# Patient Record
Sex: MALE | Race: WHITE | NOT HISPANIC OR LATINO | Employment: UNEMPLOYED | URBAN - NONMETROPOLITAN AREA
[De-identification: names, ages, dates, MRNs, and addresses within clinical notes are randomized per-mention and may not be internally consistent; named-entity substitution may affect disease eponyms.]

---

## 2024-05-20 ENCOUNTER — HOSPITAL ENCOUNTER (EMERGENCY)
Facility: HOSPITAL | Age: 69
Discharge: PSYCHIATRIC OR SUBSTANCE USE FACILITY | DRG: 057 | End: 2024-05-20
Attending: EMERGENCY MEDICINE | Admitting: EMERGENCY MEDICINE
Payer: MEDICARE

## 2024-05-20 ENCOUNTER — HOSPITAL ENCOUNTER (INPATIENT)
Facility: HOSPITAL | Age: 69
LOS: 4 days | Discharge: HOME | DRG: 057 | End: 2024-05-24
Attending: PSYCHIATRY & NEUROLOGY | Admitting: PSYCHIATRY & NEUROLOGY
Payer: MEDICARE

## 2024-05-20 VITALS
HEIGHT: 73 IN | BODY MASS INDEX: 27.17 KG/M2 | WEIGHT: 205 LBS | HEART RATE: 60 BPM | SYSTOLIC BLOOD PRESSURE: 101 MMHG | OXYGEN SATURATION: 99 % | TEMPERATURE: 97.8 F | RESPIRATION RATE: 17 BRPM | DIASTOLIC BLOOD PRESSURE: 50 MMHG

## 2024-05-20 DIAGNOSIS — R44.3 HALLUCINATIONS: Primary | ICD-10-CM

## 2024-05-20 PROBLEM — R46.89 BEHAVIORAL CHANGE: Status: ACTIVE | Noted: 2024-05-20

## 2024-05-20 LAB
ALBUMIN SERPL-MCNC: 4.2 G/DL (ref 3.5–5.2)
ALBUMIN/GLOB SERPL: 1.6 G/DL
ALP SERPL-CCNC: 86 U/L (ref 39–117)
ALT SERPL W P-5'-P-CCNC: 9 U/L (ref 1–41)
AMPHET+METHAMPHET UR QL: NEGATIVE
AMPHETAMINES UR QL: NEGATIVE
ANION GAP SERPL CALCULATED.3IONS-SCNC: 8.3 MMOL/L (ref 5–15)
AST SERPL-CCNC: 12 U/L (ref 1–40)
BARBITURATES UR QL SCN: NEGATIVE
BASOPHILS # BLD AUTO: 0.07 10*3/MM3 (ref 0–0.2)
BASOPHILS NFR BLD AUTO: 1.2 % (ref 0–1.5)
BENZODIAZ UR QL SCN: NEGATIVE
BILIRUB SERPL-MCNC: 0.8 MG/DL (ref 0–1.2)
BILIRUB UR QL STRIP: ABNORMAL
BUN SERPL-MCNC: 16 MG/DL (ref 8–23)
BUN/CREAT SERPL: 17.8 (ref 7–25)
BUPRENORPHINE SERPL-MCNC: NEGATIVE NG/ML
CALCIUM SPEC-SCNC: 9.2 MG/DL (ref 8.6–10.5)
CANNABINOIDS SERPL QL: NEGATIVE
CHLORIDE SERPL-SCNC: 109 MMOL/L (ref 98–107)
CLARITY UR: CLEAR
CO2 SERPL-SCNC: 25.7 MMOL/L (ref 22–29)
COCAINE UR QL: NEGATIVE
COLOR UR: ABNORMAL
CREAT SERPL-MCNC: 0.9 MG/DL (ref 0.76–1.27)
DEPRECATED RDW RBC AUTO: 41.6 FL (ref 37–54)
EGFRCR SERPLBLD CKD-EPI 2021: 93.6 ML/MIN/1.73
EOSINOPHIL # BLD AUTO: 0.12 10*3/MM3 (ref 0–0.4)
EOSINOPHIL NFR BLD AUTO: 2 % (ref 0.3–6.2)
ERYTHROCYTE [DISTWIDTH] IN BLOOD BY AUTOMATED COUNT: 12.7 % (ref 12.3–15.4)
ETHANOL BLD-MCNC: <10 MG/DL (ref 0–10)
ETHANOL UR QL: <0.01 %
FENTANYL UR-MCNC: NEGATIVE NG/ML
GLOBULIN UR ELPH-MCNC: 2.7 GM/DL
GLUCOSE SERPL-MCNC: 113 MG/DL (ref 65–99)
GLUCOSE UR STRIP-MCNC: NEGATIVE MG/DL
HCT VFR BLD AUTO: 39.5 % (ref 37.5–51)
HGB BLD-MCNC: 13.2 G/DL (ref 13–17.7)
HGB UR QL STRIP.AUTO: NEGATIVE
IMM GRANULOCYTES # BLD AUTO: 0.02 10*3/MM3 (ref 0–0.05)
IMM GRANULOCYTES NFR BLD AUTO: 0.3 % (ref 0–0.5)
KETONES UR QL STRIP: ABNORMAL
LEUKOCYTE ESTERASE UR QL STRIP.AUTO: NEGATIVE
LYMPHOCYTES # BLD AUTO: 0.62 10*3/MM3 (ref 0.7–3.1)
LYMPHOCYTES NFR BLD AUTO: 10.6 % (ref 19.6–45.3)
MAGNESIUM SERPL-MCNC: 2.2 MG/DL (ref 1.6–2.4)
MCH RBC QN AUTO: 30.4 PG (ref 26.6–33)
MCHC RBC AUTO-ENTMCNC: 33.4 G/DL (ref 31.5–35.7)
MCV RBC AUTO: 91 FL (ref 79–97)
METHADONE UR QL SCN: NEGATIVE
MONOCYTES # BLD AUTO: 0.32 10*3/MM3 (ref 0.1–0.9)
MONOCYTES NFR BLD AUTO: 5.5 % (ref 5–12)
NEUTROPHILS NFR BLD AUTO: 4.71 10*3/MM3 (ref 1.7–7)
NEUTROPHILS NFR BLD AUTO: 80.4 % (ref 42.7–76)
NITRITE UR QL STRIP: NEGATIVE
NRBC BLD AUTO-RTO: 0 /100 WBC (ref 0–0.2)
OPIATES UR QL: POSITIVE
OXYCODONE UR QL SCN: NEGATIVE
PCP UR QL SCN: NEGATIVE
PH UR STRIP.AUTO: 5.5 [PH] (ref 5–8)
PLATELET # BLD AUTO: 182 10*3/MM3 (ref 140–450)
PMV BLD AUTO: 9.2 FL (ref 6–12)
POTASSIUM SERPL-SCNC: 4 MMOL/L (ref 3.5–5.2)
PROT SERPL-MCNC: 6.9 G/DL (ref 6–8.5)
PROT UR QL STRIP: ABNORMAL
RBC # BLD AUTO: 4.34 10*6/MM3 (ref 4.14–5.8)
SODIUM SERPL-SCNC: 143 MMOL/L (ref 136–145)
SP GR UR STRIP: 1.03 (ref 1–1.03)
TRICYCLICS UR QL SCN: POSITIVE
UROBILINOGEN UR QL STRIP: ABNORMAL
WBC NRBC COR # BLD AUTO: 5.86 10*3/MM3 (ref 3.4–10.8)

## 2024-05-20 PROCEDURE — 82077 ASSAY SPEC XCP UR&BREATH IA: CPT | Performed by: EMERGENCY MEDICINE

## 2024-05-20 PROCEDURE — 80307 DRUG TEST PRSMV CHEM ANLYZR: CPT | Performed by: EMERGENCY MEDICINE

## 2024-05-20 PROCEDURE — 36415 COLL VENOUS BLD VENIPUNCTURE: CPT

## 2024-05-20 PROCEDURE — 85025 COMPLETE CBC W/AUTO DIFF WBC: CPT | Performed by: EMERGENCY MEDICINE

## 2024-05-20 PROCEDURE — 80053 COMPREHEN METABOLIC PANEL: CPT | Performed by: EMERGENCY MEDICINE

## 2024-05-20 PROCEDURE — 83735 ASSAY OF MAGNESIUM: CPT | Performed by: EMERGENCY MEDICINE

## 2024-05-20 PROCEDURE — 81003 URINALYSIS AUTO W/O SCOPE: CPT | Performed by: EMERGENCY MEDICINE

## 2024-05-20 PROCEDURE — 99285 EMERGENCY DEPT VISIT HI MDM: CPT

## 2024-05-20 PROCEDURE — 93005 ELECTROCARDIOGRAM TRACING: CPT | Performed by: PSYCHIATRY & NEUROLOGY

## 2024-05-20 PROCEDURE — 93010 ELECTROCARDIOGRAM REPORT: CPT | Performed by: INTERNAL MEDICINE

## 2024-05-20 RX ORDER — BISACODYL 5 MG/1
5 TABLET, DELAYED RELEASE ORAL DAILY PRN
Status: DISCONTINUED | OUTPATIENT
Start: 2024-05-20 | End: 2024-05-24 | Stop reason: HOSPADM

## 2024-05-20 RX ORDER — BENZONATATE 100 MG/1
100 CAPSULE ORAL 3 TIMES DAILY PRN
Status: DISCONTINUED | OUTPATIENT
Start: 2024-05-20 | End: 2024-05-24 | Stop reason: HOSPADM

## 2024-05-20 RX ORDER — ONDANSETRON 4 MG/1
4 TABLET, ORALLY DISINTEGRATING ORAL EVERY 6 HOURS PRN
Status: DISCONTINUED | OUTPATIENT
Start: 2024-05-20 | End: 2024-05-24 | Stop reason: HOSPADM

## 2024-05-20 RX ORDER — NICOTINE 21 MG/24HR
1 PATCH, TRANSDERMAL 24 HOURS TRANSDERMAL
Status: DISCONTINUED | OUTPATIENT
Start: 2024-05-20 | End: 2024-05-24 | Stop reason: HOSPADM

## 2024-05-20 RX ORDER — ECHINACEA PURPUREA EXTRACT 125 MG
2 TABLET ORAL AS NEEDED
Status: DISCONTINUED | OUTPATIENT
Start: 2024-05-20 | End: 2024-05-24 | Stop reason: HOSPADM

## 2024-05-20 RX ORDER — HYDROCODONE BITARTRATE AND ACETAMINOPHEN 7.5; 325 MG/1; MG/1
1.5 TABLET ORAL DAILY
COMMUNITY

## 2024-05-20 RX ORDER — CYPROHEPTADINE HYDROCHLORIDE 4 MG/1
4 TABLET ORAL NIGHTLY
COMMUNITY

## 2024-05-20 RX ORDER — TRAZODONE HYDROCHLORIDE 50 MG/1
12.5 TABLET ORAL NIGHTLY PRN
Status: ACTIVE | OUTPATIENT
Start: 2024-05-20 | End: 2024-05-22

## 2024-05-20 RX ORDER — LOPERAMIDE HYDROCHLORIDE 2 MG/1
2 CAPSULE ORAL
Status: DISCONTINUED | OUTPATIENT
Start: 2024-05-20 | End: 2024-05-24 | Stop reason: HOSPADM

## 2024-05-20 RX ORDER — TIZANIDINE 4 MG/1
4 TABLET ORAL NIGHTLY PRN
COMMUNITY

## 2024-05-20 RX ORDER — ALUMINA, MAGNESIA, AND SIMETHICONE 2400; 2400; 240 MG/30ML; MG/30ML; MG/30ML
15 SUSPENSION ORAL EVERY 6 HOURS PRN
Status: DISCONTINUED | OUTPATIENT
Start: 2024-05-20 | End: 2024-05-24 | Stop reason: HOSPADM

## 2024-05-20 RX ORDER — ACETAMINOPHEN 325 MG/1
650 TABLET ORAL EVERY 6 HOURS PRN
Status: DISCONTINUED | OUTPATIENT
Start: 2024-05-20 | End: 2024-05-24 | Stop reason: HOSPADM

## 2024-05-20 RX ORDER — GABAPENTIN 300 MG/1
300 CAPSULE ORAL DAILY
COMMUNITY

## 2024-05-20 RX ORDER — FUROSEMIDE 20 MG/1
20 TABLET ORAL DAILY
COMMUNITY

## 2024-05-20 RX ORDER — IBUPROFEN 400 MG/1
400 TABLET ORAL EVERY 6 HOURS PRN
Status: DISCONTINUED | OUTPATIENT
Start: 2024-05-20 | End: 2024-05-24 | Stop reason: HOSPADM

## 2024-05-20 RX ORDER — QUETIAPINE FUMARATE 25 MG/1
25 TABLET, FILM COATED ORAL EVERY MORNING
COMMUNITY
End: 2024-05-24 | Stop reason: HOSPADM

## 2024-05-20 RX ORDER — ROPINIROLE 6 MG/1
6 TABLET, FILM COATED, EXTENDED RELEASE ORAL NIGHTLY
COMMUNITY

## 2024-05-20 RX ORDER — MIDODRINE HYDROCHLORIDE 5 MG/1
5 TABLET ORAL
Status: ON HOLD | COMMUNITY
End: 2024-05-21
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
## 2024-05-21 PROBLEM — G89.29 CHRONIC PAIN: Status: ACTIVE | Noted: 2024-05-21

## 2024-05-21 PROBLEM — F02.818 DEMENTIA DUE TO PARKINSON'S DISEASE WITH BEHAVIORAL DISTURBANCE: Status: ACTIVE | Noted: 2024-05-21

## 2024-05-21 PROBLEM — G20.A1 DEMENTIA DUE TO PARKINSON'S DISEASE WITH BEHAVIORAL DISTURBANCE: Status: ACTIVE | Noted: 2024-05-21

## 2024-05-21 LAB
CHOLEST SERPL-MCNC: 134 MG/DL (ref 0–200)
GEN 5 2HR TROPONIN T REFLEX: 14 NG/L
HAV IGM SERPL QL IA: NORMAL
HBA1C MFR BLD: 5.7 % (ref 4.8–5.6)
HBV CORE IGM SERPL QL IA: NORMAL
HBV SURFACE AG SERPL QL IA: NORMAL
HCV AB SER QL: NORMAL
HDLC SERPL-MCNC: 45 MG/DL (ref 40–60)
LDLC SERPL CALC-MCNC: 78 MG/DL (ref 0–100)
LDLC/HDLC SERPL: 1.75 {RATIO}
TRIGL SERPL-MCNC: 51 MG/DL (ref 0–150)
TROPONIN T DELTA: 0 NG/L
TROPONIN T SERPL HS-MCNC: 14 NG/L
VLDLC SERPL-MCNC: 11 MG/DL (ref 5–40)

## 2024-05-21 PROCEDURE — 93010 ELECTROCARDIOGRAM REPORT: CPT | Performed by: INTERNAL MEDICINE

## 2024-05-21 PROCEDURE — 93005 ELECTROCARDIOGRAM TRACING: CPT | Performed by: PSYCHIATRY & NEUROLOGY

## 2024-05-21 PROCEDURE — 99223 1ST HOSP IP/OBS HIGH 75: CPT | Performed by: PSYCHIATRY & NEUROLOGY

## 2024-05-21 PROCEDURE — 83036 HEMOGLOBIN GLYCOSYLATED A1C: CPT | Performed by: PSYCHIATRY & NEUROLOGY

## 2024-05-21 PROCEDURE — 80061 LIPID PANEL: CPT | Performed by: PSYCHIATRY & NEUROLOGY

## 2024-05-21 PROCEDURE — 80074 ACUTE HEPATITIS PANEL: CPT | Performed by: PSYCHIATRY & NEUROLOGY

## 2024-05-21 PROCEDURE — 84484 ASSAY OF TROPONIN QUANT: CPT | Performed by: PSYCHIATRY & NEUROLOGY

## 2024-05-21 RX ORDER — CYPROHEPTADINE HYDROCHLORIDE 4 MG/1
4 TABLET ORAL NIGHTLY
Status: DISCONTINUED | OUTPATIENT
Start: 2024-05-21 | End: 2024-05-24 | Stop reason: HOSPADM

## 2024-05-21 RX ORDER — TIZANIDINE 4 MG/1
4 TABLET ORAL NIGHTLY PRN
Status: DISCONTINUED | OUTPATIENT
Start: 2024-05-21 | End: 2024-05-24 | Stop reason: HOSPADM

## 2024-05-21 RX ORDER — FUROSEMIDE 20 MG/1
20 TABLET ORAL DAILY
Status: DISCONTINUED | OUTPATIENT
Start: 2024-05-21 | End: 2024-05-24 | Stop reason: HOSPADM

## 2024-05-21 RX ORDER — QUETIAPINE FUMARATE 100 MG/1
25 TABLET, FILM COATED ORAL EVERY MORNING
Status: DISCONTINUED | OUTPATIENT
Start: 2024-05-21 | End: 2024-05-22

## 2024-05-21 RX ORDER — HYDROCODONE BITARTRATE AND ACETAMINOPHEN 7.5; 325 MG/1; MG/1
1.5 TABLET ORAL DAILY
Status: DISCONTINUED | OUTPATIENT
Start: 2024-05-21 | End: 2024-05-24 | Stop reason: HOSPADM

## 2024-05-21 RX ORDER — QUETIAPINE FUMARATE 25 MG/1
50 TABLET, FILM COATED ORAL NIGHTLY
COMMUNITY
End: 2024-05-24 | Stop reason: HOSPADM

## 2024-05-21 RX ORDER — ROPINIROLE 1 MG/1
2 TABLET, FILM COATED ORAL EVERY 8 HOURS SCHEDULED
Status: DISCONTINUED | OUTPATIENT
Start: 2024-05-21 | End: 2024-05-24 | Stop reason: HOSPADM

## 2024-05-21 RX ORDER — QUETIAPINE FUMARATE 100 MG/1
50 TABLET, FILM COATED ORAL NIGHTLY
Status: DISCONTINUED | OUTPATIENT
Start: 2024-05-21 | End: 2024-05-22

## 2024-05-21 RX ORDER — GABAPENTIN 300 MG/1
300 CAPSULE ORAL DAILY
Status: DISCONTINUED | OUTPATIENT
Start: 2024-05-21 | End: 2024-05-24 | Stop reason: HOSPADM

## 2024-05-21 RX ADMIN — CYPROHEPTADINE HYDROCHLORIDE 4 MG: 4 TABLET ORAL at 21:41

## 2024-05-21 RX ADMIN — FUROSEMIDE 20 MG: 20 TABLET ORAL at 15:18

## 2024-05-21 RX ADMIN — GABAPENTIN 300 MG: 300 CAPSULE ORAL at 15:14

## 2024-05-21 RX ADMIN — IBUPROFEN 400 MG: 400 TABLET, FILM COATED ORAL at 05:23

## 2024-05-21 RX ADMIN — QUETIAPINE FUMARATE 25 MG: 100 TABLET ORAL at 15:14

## 2024-05-21 RX ADMIN — QUETIAPINE FUMARATE 50 MG: 100 TABLET ORAL at 21:41

## 2024-05-21 RX ADMIN — ROPINIROLE HYDROCHLORIDE 2 MG: 1 TABLET, FILM COATED ORAL at 15:18

## 2024-05-21 RX ADMIN — ROPINIROLE HYDROCHLORIDE 2 MG: 1 TABLET, FILM COATED ORAL at 21:41

## 2024-05-21 RX ADMIN — HYDROCODONE BITARTRATE AND ACETAMINOPHEN 1.5 TABLET: 7.5; 325 TABLET ORAL at 15:14

## 2024-05-21 NOTE — DISCHARGE INSTR - APPOINTMENTS
Eduar Wilkinson MD  [address] [place] [id number] [phone]    Please contact the office for an appointment within 5-7 days of discharge.       Martina [phone]    Will follow up after discharge.       Additional Resources:     [facility] [phone]    [place] [facility] [phone]  *request to speak with someone about in-home services for seniors

## 2024-05-21 NOTE — PLAN OF CARE
Problem: Adult Behavioral Health Plan of Care  Goal: Plan of Care Review  Outcome: Ongoing, Progressing  Flowsheets  Taken 5/21/2024 0926 by Vika Topete  Consent Given to Review Plan with: Phylicia Bernal, wife and dillon Brown  Progress: improving  Outcome Evaluation:   Therapist met with patient to review plan of care   patient agreeable.  Taken 5/20/2024 2012 by Lila Marr, RN  Plan of Care Reviewed With: patient  Patient Agreement with Plan of Care: agrees  Goal: Patient-Specific Goal (Individualization)  Outcome: Ongoing, Progressing  Flowsheets  Taken 5/21/2024 0926  Patient-Specific Goals (Include Timeframe): Identify 2-3 healthy coping skills, deny SI/HI, complete safety planning, and complete aftercare planning prior to discharge  Individualized Care Needs: Therapist to offer 1-4 individual sessions, daily groups, safety planning, aftercare planning, and brief CBT interventions during hospitalization  Taken 5/21/2024 0922  Patient Personal Strengths:   expressive of emotions   expressive of needs   resilient   resourceful   motivated for treatment   motivated for recovery  Patient Vulnerabilities:   poor physical health   poor impulse control  Goal: Optimized Coping Skills in Response to Life Stressors  Outcome: Ongoing, Progressing  Flowsheets (Taken 5/21/2024 0926)  Optimized Coping Skills in Response to Life Stressors: making progress toward outcome  Intervention: Promote Effective Coping Strategies  Flowsheets (Taken 5/21/2024 0926)  Supportive Measures:   active listening utilized   relaxation techniques promoted   verbalization of feelings encouraged   self-care encouraged   counseling provided   decision-making supported   positive reinforcement provided   self-reflection promoted   self-responsibility promoted   problem-solving facilitated   goal-setting facilitated  Goal: Develops/Participates in Therapeutic [place] to Support Successful Transition  Outcome: Ongoing,  Progressing  Flowsheets (Taken 5/21/2024 0926)  Develops/Participates in Therapeutic [place] to Support Successful Transition: making progress toward outcome  Intervention: Foster Therapeutic [place]  Flowsheets (Taken 5/21/2024 0926)  Trust Relationship/Rapport:   care explained   questions answered   questions encouraged   choices provided   emotional support provided   reassurance provided   empathic listening provided   thoughts/feelings acknowledged  Intervention: Mutually Develop Transition Plan  Flowsheets  Taken 5/21/2024 0926 by Vika Topete  Outpatient/Agency/Support Group Needs:   outpatient medication management   outpatient counseling   outpatient psychiatric care (specify)  Discharge Coordination/Progress: Patient has insurance and denies transportation concerns. Patient agreeable with discharge planning.  Transition Support:   community resources reviewed   follow-up care coordinated   follow-up care discussed   crisis management plan promoted   crisis management plan verbalized  Anticipated Discharge Disposition: home with family  Transportation Concerns: none  Current Discharge Risk: cognitively impaired  Concerns to be Addressed: cognitive/perceptual  Readmission Within the Last 30 Days: no previous admission in last 30 days  Patient's Choice of Community Agency(s): Dr. GERA Wilkinson's office  Offered/Gave Vendor List: no  Taken 5/20/2024 2012 by Lila Marr, RN  Transportation Anticipated: family or friend will provide  Patient/Family Anticipated Services at Transition:   mental health services   outpatient care  Patient/Family Anticipates Transition to: home with family   Goal Outcome Evaluation:  Consent Given to Review Plan with: Phylicia Bernal, wife and dillon Brown  Progress: improving  Outcome Evaluation: Therapist met with patient to review plan of care; patient agreeable.       DATA:      Therapist discussed case with Dr. Corrales and met with patient today to review coping skills,  review plan of care, and discuss discharge.    Therapist recommends outpatient therapy and medication management; patient is agreeable. Therapist obtained consent for Dr. GERA Wilkinson's office in [place].     Therapist recommends family involvement in care; patient agreeable. Therapist obtained consent for patient's wife and stepsonPhylicia and Tone. Therapist spoke with patient's wife, Phylicia, at [phone]. She reports the patient was diagnosed with parkinson's four years ago and dementia less than a year ago. She reports that his symptoms have been progressing recently. Phylicia advised that the patient has been hallucinating, paranoid, anxious, restless, and not sleeping well recently.  She reports he recently got upset in the middle of the night because he believed there were six men in their room who were trying to harm her. She reports his symptoms fluctuate. He has better days and worse days. Phylicia advised that they believe he needs nursing home placement soon. Therapist advised her of the difficulties of placement from this facility and encouraged her to ask his PCP's office for assistance with this. She is agreeable.     Therapist completed safety planning, advising Phylicia to secure weapons/firearms, medications, and knives/sharp objects in the home. She is agreeable and confirms firearms have already been secured in a safe. Phylicia advises they will secure other objects prior to discharge.      Clinical Maneuvering/Intervention:     Therapist assisted patient in processing above session content; acknowledged and normalized patient’s thoughts, feelings, and concerns.  Discussed the therapist/patient relationship and explain the parameters and limitations of relative confidentiality.  Also discussed the importance of active participation, and honesty to the treatment process.  Encouraged the patient to discuss/vent their feelings, frustrations, and fears concerning their ongoing medical issues and  "validated their feelings.     Allowed patient to freely discuss issues without interruption or judgment. Provided safe, confidential environment to facilitate the development of positive therapeutic relationship and encourage open, honest communication.      Therapist addressed discharge safety planning this date. Assisted patient in identifying risk factors which would indicate the need for higher level of care after discharge;  including thoughts to harm self or others and/or self-harming behavior. Encouraged patient to call 911, or present to the nearest emergency room should any of these events occur. Discussed crisis intervention services and means to access.  Encouraged securing any objects of harm.    Therapist completed integrated summary, treatment plan, and initiated social history this date.  Therapist is strongly encouraging family involvement in treatment.       Encouraged mask wearing, social distancing, and regular hand washing due to COVID19 risk.      ASSESSMENT:      Patient is a 68 year old  male who presented to the ED for behavioral concerns related to dementia and parkinson's disease. Patient currently lives with his wife, step-son, step daughter-in-law, and step-grandson. He receives social security benefits and has a bachelors degree. This is the patient's first inpatient psychiatric admission.     Therapist met 1:1 with patient today. Patient denies suicidal ideation. Patient denies homicidal ideation. Patient denies AVH. Patient is calm and cooperative with session. Patient is oriented x4. He advised that he has been feeling anxious recently because his wife has been sick. Patient reports she has had a cast or brace on her leg for months and uses a walker to ambulate. He reports some recent conflict with his step-son, stating he is \"a little hot headed.\" Patient reports he has been physically weaker and unable to do as much as usual for the last couple of months. He reports his " wife has been worried that he seems more agitated but he does not recall any recent periods of agitation. Patient does struggle to answer some questions, but his basic recall seems fairly well. He denies any major needs or concerns.      PLAN:       Patient to remain hospitalized this date.      Treatment team will focus efforts on stabilizing patient's acute symptoms while providing education on healthy coping and crisis management to reduce hospitalizations.   Patient requires daily psychiatrist evaluation and 24/7 nursing supervision to promote patient  safety.     Therapist will offer 1-4 individual sessions, 1 therapy group daily, family education, and appropriate referral.
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
## 2024-05-21 NOTE — PLAN OF CARE
Goal Outcome Evaluation:  Plan of Care Reviewed With: patient  Patient Agreement with Plan of Care: agrees     Progress: improving  Outcome Evaluation: Pt is calm and cooperative with staff. Pt reports fair sleep and good appetite. Pt rates dep 2 and anx 4. Pt denies SI/HI/AVH at this time.

## 2024-05-21 NOTE — NURSING NOTE
Upon waking pt for am VS and breakfast; pt stated was having some left sided arm pain that went to his chest sometime last night/this morning, but pt did not notify staff at that time. No acute s/s distress. VS as follows: HR 55, resp 18, temp 97.9, /97 sitting, 98% O2 sat, skin warm, pink, and dry, respirations equal and non-labored. MD notified.

## 2024-05-21 NOTE — NURSING NOTE
"During room time, pt came out to get SN, and stated, \"we have a problem\". Followed pt back to room and pointed to sheet and pt stated, \"I already killed three of them\" while pointing to bread crumbs on his sheet. Advised pt those were bread crumbs on his sheet and part of sandwich found at foot of his bed in the covers while changing sheet. Pt states, \"there's another name for those bugs, but thank you\". Pt set down on bed after sheets changed - no s/s distress noted. Pt remains alert and oriented.  "

## 2024-05-22 LAB
QT INTERVAL: 486 MS
QT INTERVAL: 496 MS
QTC INTERVAL: 470 MS
QTC INTERVAL: 473 MS

## 2024-05-22 PROCEDURE — 99232 SBSQ HOSP IP/OBS MODERATE 35: CPT | Performed by: PSYCHIATRY & NEUROLOGY

## 2024-05-22 RX ADMIN — ROPINIROLE HYDROCHLORIDE 2 MG: 1 TABLET, FILM COATED ORAL at 14:13

## 2024-05-22 RX ADMIN — ROPINIROLE HYDROCHLORIDE 2 MG: 1 TABLET, FILM COATED ORAL at 21:20

## 2024-05-22 RX ADMIN — QUETIAPINE FUMARATE 25 MG: 100 TABLET ORAL at 08:43

## 2024-05-22 RX ADMIN — CYPROHEPTADINE HYDROCHLORIDE 4 MG: 4 TABLET ORAL at 21:20

## 2024-05-22 RX ADMIN — GABAPENTIN 300 MG: 300 CAPSULE ORAL at 08:42

## 2024-05-22 RX ADMIN — HYDROCODONE BITARTRATE AND ACETAMINOPHEN 1.5 TABLET: 7.5; 325 TABLET ORAL at 08:38

## 2024-05-22 RX ADMIN — ROPINIROLE HYDROCHLORIDE 2 MG: 1 TABLET, FILM COATED ORAL at 05:39

## 2024-05-22 RX ADMIN — FUROSEMIDE 20 MG: 20 TABLET ORAL at 08:35
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
## 2024-05-22 NOTE — PLAN OF CARE
Goal Outcome Evaluation:  Plan of Care Reviewed With: patient  Patient Agreement with Plan of Care: agrees     Progress: improving     Pt reports good sleep and good appetite. Pt rates anx 3/10 and dep 3/10. Pt denies SI/HI/AVH.

## 2024-05-22 NOTE — PLAN OF CARE
Problem: Adult Behavioral Health Plan of Care  Goal: Optimized Coping Skills in Response to Life Stressors  Outcome: Ongoing, Progressing  Flowsheets (Taken 5/21/2024 0926)  Optimized Coping Skills in Response to Life Stressors: making progress toward outcome  Intervention: Promote Effective Coping Strategies  Flowsheets (Taken 5/22/2024 1028)  Supportive Measures:   active listening utilized   counseling provided   decision-making supported   goal-setting facilitated   problem-solving facilitated   self-responsibility promoted   self-reflection promoted   positive reinforcement provided   self-care encouraged   relaxation techniques promoted   verbalization of feelings encouraged  Goal: Develops/Participates in Therapeutic [place] to Support Successful Transition  Outcome: Ongoing, Progressing  Flowsheets (Taken 5/21/2024 0926)  Develops/Participates in Therapeutic [place] to Support Successful Transition: making progress toward outcome  Intervention: Foster Therapeutic [place]  Flowsheets (Taken 5/22/2024 1028)  Trust Relationship/Rapport:   care explained   choices provided   emotional support provided   thoughts/feelings acknowledged   empathic listening provided   reassurance provided   questions answered   questions encouraged  Intervention: Mutually Develop Transition Plan  Flowsheets  Taken 5/22/2024 1028 by Vika Topete  Offered/Gave Vendor List: no  Taken 5/21/2024 0926 by Vika Topete  Outpatient/Agency/Support Group Needs:   outpatient medication management   outpatient counseling   outpatient psychiatric care (specify)  Discharge Coordination/Progress: Patient has insurance and denies transportation concerns. Patient agreeable with discharge planning.  Transition Support:   community resources reviewed   follow-up care coordinated   follow-up care discussed   crisis management plan promoted   crisis management plan verbalized  Anticipated Discharge Disposition: home with family  Transportation  Concerns: none  Current Discharge Risk: cognitively impaired  Concerns to be Addressed: cognitive/perceptual  Readmission Within the Last 30 Days: no previous admission in last 30 days  Patient's Choice of Community Agency(s): Dr. GERA Wilkinson's office  Taken 5/20/2024 2012 by Lila Marr, RN  Transportation Anticipated: family or friend will provide  Patient/Family Anticipated Services at Transition:   mental health services   outpatient care  Patient/Family Anticipates Transition to: home with family  Goal Outcome Evaluation:  Consent Given to Review Plan with: Phylicia Bernal, wife and dillon Brown  Progress: improving  Outcome Evaluation: Therapist met with patient to review plan of care; patient agreeable.       DATA:      Therapist discussed case with Dr. Corrales and met with patient today to review coping skills, review plan of care, and discuss discharge.    Therapist contacted patient's wife, Phylicia, and provided her with an update on the patient. She remains supportive. She continues to be agreeable with the patient coming home at discharge. She is unsure if she'd like to go to the nursing home in the future. Therapist encouraged Phylicia to work on changing the patient's insurance from a medicare replacement plan to traditional medicare to have access to more services. We discussed resources that may be helpful, including a psychiatrist/psych NP and in-home services. Phylicia reports that the patient was about to start receiving in-home psychiatric services through his home health company, [employer]. Will do some research into any other in-home services in the patient's area.      Clinical Maneuvering/Intervention:     Therapist assisted patient in processing above session content; acknowledged and normalized patient’s thoughts, feelings, and concerns.  Discussed the therapist/patient relationship and explain the parameters and limitations of relative confidentiality.  Also discussed the importance of  active participation, and honesty to the treatment process.  Encouraged the patient to discuss/vent their feelings, frustrations, and fears concerning their ongoing medical issues and validated their feelings.     Allowed patient to freely discuss issues without interruption or judgment. Provided safe, confidential environment to facilitate the development of positive therapeutic relationship and encourage open, honest communication.      Therapist addressed discharge safety planning this date. Assisted patient in identifying risk factors which would indicate the need for higher level of care after discharge;  including thoughts to harm self or others and/or self-harming behavior. Encouraged patient to call 911, or present to the nearest emergency room should any of these events occur. Discussed crisis intervention services and means to access.  Encouraged securing any objects of harm.    Therapist completed integrated summary, treatment plan, and initiated social history this date.  Therapist is strongly encouraging family involvement in treatment.       Encouraged mask wearing, social distancing, and regular hand washing due to COVID19 risk.      ASSESSMENT:      Therapist met 1:1 with patient today. Patient denies suicidal ideation. Patient denies homicidal ideation. Patient denies AVH. Patient is calm and cooperative with session. He is lethargic today, continually falling asleep in his chair. Patient slept well last night. He likely was not taking his medications as prescribed at home. No behavioral outbursts.      PLAN:       Patient to remain hospitalized this date.      Treatment team will focus efforts on stabilizing patient's acute symptoms while providing education on healthy coping and crisis management to reduce hospitalizations.   Patient requires daily psychiatrist evaluation and 24/7 nursing supervision to promote patient  safety.     Therapist will offer 1-4 individual sessions, 1 therapy group  daily, family education, and appropriate referral.

## 2024-05-22 NOTE — PROGRESS NOTES
"INPATIENT PSYCHIATRIC PROGRESS NOTE    Name:  Refugio Bernal  :  1955  MRN:  [id number]  Visit Number:  [id number]  Length of stay:  2    SUBJECTIVE    CC/Focus of Exam: confusion, agitation    INTERVAL HISTORY:  The patient's condition reportedly deteriorated last night and this morning where he was very lethargic and had bowel incontinence. It appears the patient was not taking his home medications regularly and starting them back caused him to be over-sedated. He is oriented to place, month, year but not day.   Depression rating 4/10  Anxiety rating 4/10  Sleep: Good  Withdrawal sx:None  Cravin/10    Review of Systems   Constitutional:  Positive for fatigue.   Respiratory: Negative.     Cardiovascular: Negative.    Neurological:  Positive for weakness.   Psychiatric/Behavioral:  Positive for confusion.        OBJECTIVE    Temp:  [97 °F (36.1 °C)-97.8 °F (36.6 °C)] 97.8 °F (36.6 °C)  Heart Rate:  [53-60] 54  Resp:  [16] 16  BP: (125-157)/(71-78) 125/71    MENTAL STATUS EXAM:  Appearance:Casually dressed, good hygeine.   Cooperation:Cooperative  Psychomotor: No psychomotor agitation/retardation, No EPS, No motor tics  Speech-normal rate, amount.  Mood \"okay\"   Affect-congruent, appropriate, stable  Thought Content-goal directed, no delusional material present  Thought process-some perseveration noted, patient continued to talk about time in the police, context not known.   Suicidality: No SI  Homicidality: No HI  Perception: No AH/VH  Insight-limited   Judgement-limited    Lab Results (last 24 hours)       ** No results found for the last 24 hours. **               Imaging Results (Last 24 Hours)       ** No results found for the last 24 hours. **               ECG/EMG Results (most recent)       Procedure Component Value Units Date/Time    ECG 12 Lead Other; Baseline Cardiac Status [[id number]] Collected: 24     Updated: 24     QT Interval 496 ms      QTC Interval 470 ms     Narrative:  "     Test Reason : Other~  Blood Pressure :   */*   mmHG  Vent. Rate :  54 BPM     Atrial Rate :  54 BPM     P-R Int :   * ms          QRS Dur :  88 ms      QT Int : 496 ms       P-R-T Axes :   *  12  38 degrees     QTc Int : 470 ms    Junctional rhythm  Abnormal ECG  No previous ECGs available    Referred By:            Confirmed By:     ECG 12 Lead Chest Pain [[id number]] Collected: 05/21/24 0837     Updated: 05/21/24 0840     QT Interval 486 ms      QTC Interval 473 ms     Narrative:      Test Reason : Chest Pain  Blood Pressure :   */*   mmHG  Vent. Rate :  57 BPM     Atrial Rate :  57 BPM     P-R Int : 160 ms          QRS Dur :  92 ms      QT Int : 486 ms       P-R-T Axes :  22  21  48 degrees     QTc Int : 473 ms    Sinus bradycardia  Otherwise normal ECG  When compared with ECG of 20-MAY-2024 22:14, (Unconfirmed)  Sinus rhythm has replaced Junctional rhythm    Referred By:            Confirmed By:              ALLERGIES: Patient has no known allergies.    Medication Review:   Scheduled Medications:  cyproheptadine, 4 mg, Oral, Nightly  furosemide, 20 mg, Oral, Daily  gabapentin, 300 mg, Oral, Daily  HYDROcodone-acetaminophen, 1.5 tablet, Oral, Daily  nicotine, 1 patch, Transdermal, Q24H  Pimavanserin Tartrate, 34 mg, Oral, Daily  rOPINIRole, 2 mg, Oral, Q8H         PRN Medications:    acetaminophen    aluminum-magnesium hydroxide-simethicone    benzonatate    bisacodyl    ibuprofen    loperamide    magnesium hydroxide    ondansetron ODT    sodium chloride    tiZANidine    traZODone   All medications reviewed.    ASSESSMENT & PLAN:      Dementia due to Parkinson's disease with behavioral disturbance  -Continue Requip XL  -Stop Seroquel 25 mg am and 50 mg hs  -Continue Nuplazid (home med)       Chronic pain  -Continue Norco 7.5 mg   -Continue Neurontin  -Continue Zanaflex       Edema  -Lasix  Special precautions: Special Precautions Level 3 (q15 min checks) .    Behavioral Health Treatment Plan and Problem List:  I have reviewed and approved the Behavioral Health Treatment Plan and Problem list.  The patient has had a chance to review and agrees with the treatment plan.    Copied text in portions of this note has been reviewed and is accurate as of 05/22/24         Clinician:  Guero Corrales MD  05/22/24  15:23 EDT

## 2024-05-23 PROCEDURE — 99232 SBSQ HOSP IP/OBS MODERATE 35: CPT | Performed by: PSYCHIATRY & NEUROLOGY

## 2024-05-23 RX ORDER — CITALOPRAM 20 MG/1
10 TABLET ORAL DAILY
Status: DISCONTINUED | OUTPATIENT
Start: 2024-05-23 | End: 2024-05-23

## 2024-05-23 RX ORDER — CITALOPRAM 20 MG/1
10 TABLET ORAL NIGHTLY
Status: DISCONTINUED | OUTPATIENT
Start: 2024-05-23 | End: 2024-05-24 | Stop reason: HOSPADM

## 2024-05-23 RX ADMIN — GABAPENTIN 300 MG: 300 CAPSULE ORAL at 08:41

## 2024-05-23 RX ADMIN — FUROSEMIDE 20 MG: 20 TABLET ORAL at 08:41

## 2024-05-23 RX ADMIN — HYDROCODONE BITARTRATE AND ACETAMINOPHEN 1.5 TABLET: 7.5; 325 TABLET ORAL at 08:41

## 2024-05-23 RX ADMIN — CITALOPRAM HYDROBROMIDE 10 MG: 20 TABLET ORAL at 21:18

## 2024-05-23 RX ADMIN — ROPINIROLE HYDROCHLORIDE 2 MG: 1 TABLET, FILM COATED ORAL at 21:18

## 2024-05-23 RX ADMIN — ACETAMINOPHEN 650 MG: 325 TABLET ORAL at 21:20

## 2024-05-23 RX ADMIN — ROPINIROLE HYDROCHLORIDE 2 MG: 1 TABLET, FILM COATED ORAL at 14:36

## 2024-05-23 RX ADMIN — TIZANIDINE 4 MG: 4 TABLET ORAL at 21:20

## 2024-05-23 RX ADMIN — ROPINIROLE HYDROCHLORIDE 2 MG: 1 TABLET, FILM COATED ORAL at 06:08

## 2024-05-23 RX ADMIN — CYPROHEPTADINE HYDROCHLORIDE 4 MG: 4 TABLET ORAL at 21:18

## 2024-05-23 NOTE — NURSING NOTE
Pt up to restroom with assistance. 2+ pitting edema noted in bilateral lower extremities/feet. Bilateral feet propped up on two pillows. Pt positioned for comfort. Apple juice provided per pt's request.

## 2024-05-23 NOTE — PLAN OF CARE
Problem: Adult Behavioral Health Plan of Care  Goal: Plan of Care Review  Flowsheets (Taken 5/23/2024 0619)  Progress: improving  Plan of Care Reviewed With: patient  Patient Agreement with Plan of Care: agrees  Outcome Evaluation: Pt confused at times but easily redirected. Pt has slept andrew 6 hours last night and appetite is good. Denies anxiety and depression. Pt denies Si/HI/AVH. Pt ambulated to restroom twice thiis shift with assistance of staff and walker. 2+ pitting edema noted on bilateral lower extremities. Pt is now wearing a brief. No questions, comments or concerns for this RN or MD   Goal Outcome Evaluation:  Plan of Care Reviewed With: patient  Patient Agreement with Plan of Care: agrees     Progress: improving  Outcome Evaluation: Pt confused at times but easily redirected. Pt has slept andrew 6 hours last night and appetite is good. Denies anxiety and depression. Pt denies Si/HI/AVH. Pt ambulated to restroom twice thiis shift with assistance of staff and walker. 2+ pitting edema noted on bilateral lower extremities. Pt is now wearing a brief. No questions, comments or concerns for this RN or MD

## 2024-05-23 NOTE — PLAN OF CARE
Problem: Adult Behavioral Health Plan of Care  Goal: Optimized Coping Skills in Response to Life Stressors  Outcome: Ongoing, Progressing  Flowsheets (Taken 5/23/2024 1552)  Optimized Coping Skills in Response to Life Stressors: making progress toward outcome  Intervention: Promote Effective Coping Strategies  Flowsheets (Taken 5/23/2024 0741 by Neva Randolph RN)  Supportive Measures:   active listening utilized   decision-making supported   goal-setting facilitated   positive reinforcement provided   problem-solving facilitated   relaxation techniques promoted   self-care encouraged   self-reflection promoted   self-responsibility promoted   verbalization of feelings encouraged  Goal: Develops/Participates in Therapeutic [place] to Support Successful Transition  Outcome: Ongoing, Progressing  Flowsheets (Taken 5/23/2024 1552)  Develops/Participates in Therapeutic [place] to Support Successful Transition: making progress toward outcome  Intervention: Foster Therapeutic [place]  Flowsheets (Taken 5/23/2024 1552)  Trust Relationship/Rapport:   care explained   choices provided   emotional support provided   questions encouraged   questions answered   thoughts/feelings acknowledged   empathic listening provided   reassurance provided  Intervention: Mutually Develop Transition Plan  Flowsheets  Taken 5/23/2024 1552 by Vika Topete  Offered/Gave Vendor List: no  Taken 5/21/2024 0926 by Vika Topete  Outpatient/Agency/Support Group Needs:   outpatient medication management   outpatient counseling   outpatient psychiatric care (specify)  Discharge Coordination/Progress: Patient has insurance and denies transportation concerns. Patient agreeable with discharge planning.  Transition Support:   community resources reviewed   follow-up care coordinated   follow-up care discussed   crisis management plan promoted   crisis management plan verbalized  Anticipated Discharge Disposition: home with family  Transportation  Concerns: none  Current Discharge Risk: cognitively impaired  Concerns to be Addressed: cognitive/perceptual  Readmission Within the Last 30 Days: no previous admission in last 30 days  Patient's Choice of Community Agency(s): Dr. GERA Wilkinson's office  Taken 5/20/2024 2012 by Lila Marr, RN  Transportation Anticipated: family or friend will provide  Patient/Family Anticipated Services at Transition:   mental health services   outpatient care  Patient/Family Anticipates Transition to: home with family   Goal Outcome Evaluation:  Consent Given to Review Plan with: Phylicia Bernal, wife and dillon Brown  Progress: improving  Outcome Evaluation: Therapist met with patient to review plan of care; patient agreeable.       DATA:      Therapist discussed case with Dr. Corrales and met with patient today to review coping skills, review plan of care, and discuss discharge.    Therapist contacted [facility] in [place] to inquire about home based services. They would not be able to assist patient as [place] state. Therapist contacted [facility] and obtained information on services offered. Therapist attempted to contact [place] [employer] to inquire about services; no answer, left message.     Therapist contacted patient's wife, Phylicia, and provided her with an update. Therapist provided her with information on possible home based services in the area. She verbalized agreement.      Clinical Maneuvering/Intervention:     Therapist assisted patient in processing above session content; acknowledged and normalized patient’s thoughts, feelings, and concerns.  Discussed the therapist/patient relationship and explain the parameters and limitations of relative confidentiality.  Also discussed the importance of active participation, and honesty to the treatment process.  Encouraged the patient to discuss/vent their feelings, frustrations, and fears concerning their ongoing medical issues  and validated their feelings.     Allowed patient to freely discuss issues without interruption or judgment. Provided safe, confidential environment to facilitate the development of positive therapeutic relationship and encourage open, honest communication.      Therapist addressed discharge safety planning this date. Assisted patient in identifying risk factors which would indicate the need for higher level of care after discharge;  including thoughts to harm self or others and/or self-harming behavior. Encouraged patient to call 911, or present to the nearest emergency room should any of these events occur. Discussed crisis intervention services and means to access.  Encouraged securing any objects of harm.    Therapist completed integrated summary, treatment plan, and initiated social history this date.  Therapist is strongly encouraging family involvement in treatment.       Encouraged mask wearing, social distancing, and regular hand washing due to COVID19 risk.      ASSESSMENT:      Therapist met 1:1 with patient today. Patient denies suicidal ideation. Patient denies homicidal ideation. Patient denies AVH. Patient is calm and cooperative with session. He is more alert and active today than previously. Patient is anxious about his wife's health but denies any other problems. No behavioral concerns observed so far.      PLAN:       Patient to remain hospitalized this date.      Treatment team will focus efforts on stabilizing patient's acute symptoms while providing education on healthy coping and crisis management to reduce hospitalizations.   Patient requires daily psychiatrist evaluation and 24/7 nursing supervision to promote patient  safety.     Therapist will offer 1-4 individual sessions, 1 therapy group daily, family education, and appropriate referral.

## 2024-05-23 NOTE — PLAN OF CARE
Goal Outcome Evaluation:  Plan of Care Reviewed With: patient  Patient Agreement with Plan of Care: agrees     Progress: no change  Outcome Evaluation: PATIENT CONFUSED TO MONTH BUT MORE ORIENTED TODAY.  HAS BEEN SITTING IN DAY ROOM MOST OF THE DAY WATCHINGH TV.  VERY COOPERATIVE, GOING TO DINING ROOM FOR MEALS AND EATING 100%.  WEARING PULL UP BUT HAS BEEN TO TOILET SEVERAL TIMES FOR ELIMINATION NEEDS.                                Impression: Age-related nuclear cataract, bilateral: H25.13. Plan: Cataracts noted on exam today which account for patient's complaints. No treatment currently recommended. The patient will monitor vision changes and contact us with any decrease in vision.

## 2024-05-23 NOTE — PROGRESS NOTES
"INPATIENT PSYCHIATRIC PROGRESS NOTE    Name:  Refugio Bernal  :  1955  MRN:  [id number]  Visit Number:  [id number]  Length of stay:  3    SUBJECTIVE    CC/Focus of Exam: confusion, agitation    INTERVAL HISTORY:  The patient's is more alert and active today. He reports he was able to sleep good last night. He is oriented to month, year but not date and thinks it is May 25.   Depression rating 4/10  Anxiety rating 4/10  Sleep: Good  Withdrawal sx:None  Cravin/10    Review of Systems   Constitutional:  Positive for fatigue.   Respiratory: Negative.     Cardiovascular: Negative.    Neurological:  Positive for weakness.   Psychiatric/Behavioral:  Positive for confusion.        OBJECTIVE    Temp:  [97.3 °F (36.3 °C)-97.5 °F (36.4 °C)] 97.3 °F (36.3 °C)  Heart Rate:  [54-60] 54  Resp:  [16] 16  BP: ()/(61-77) 156/73    MENTAL STATUS EXAM:  Appearance:Casually dressed, good hygeine.   Cooperation:Cooperative  Psychomotor: No psychomotor agitation/retardation, No EPS, No motor tics  Speech-normal rate, amount.  Mood \"okay\"   Affect-congruent, appropriate, stable  Thought Content-goal directed, no delusional material present  Thought process-some perseveration noted, patient continued to talk about time in the police, context not known.   Suicidality: No SI  Homicidality: No HI  Perception: No AH/VH  Insight-limited   Judgement-limited    Lab Results (last 24 hours)       ** No results found for the last 24 hours. **               Imaging Results (Last 24 Hours)       ** No results found for the last 24 hours. **               ECG/EMG Results (most recent)       Procedure Component Value Units Date/Time    ECG 12 Lead Other; Baseline Cardiac Status [[id number]] Collected: 24 221     Updated: 24 1854     QT Interval 496 ms      QTC Interval 470 ms     Narrative:      Test Reason : Other~  Blood Pressure :   */*   mmHG  Vent. Rate :  54 BPM     Atrial Rate :  54 BPM     P-R Int :   * ms          QRS " Dur :  88 ms      QT Int : 496 ms       P-R-T Axes :   *  12  38 degrees     QTc Int : 470 ms    Normal sinus rhythm  Abnormal ECG  No previous ECGs available  Confirmed by Josie Garcia ([id number]) on 5/22/2024 6:48:26 PM    Referred By:            Confirmed By: Josie Garcia    ECG 12 Lead Chest Pain [[id number]] Collected: 05/21/24 0837     Updated: 05/22/24 1858     QT Interval 486 ms      QTC Interval 473 ms     Narrative:      Test Reason : Chest Pain  Blood Pressure :   */*   mmHG  Vent. Rate :  57 BPM     Atrial Rate :  57 BPM     P-R Int : 160 ms          QRS Dur :  92 ms      QT Int : 486 ms       P-R-T Axes :  22  21  48 degrees     QTc Int : 473 ms    Sinus bradycardia  Otherwise normal ECG  When compared with ECG of 20-MAY-2024 22:14, (Unconfirmed)  Sinus rhythm has replaced Junctional rhythm  Confirmed by Josie Garcia ([id number]) on 5/22/2024 6:50:34 PM    Referred By:            Confirmed By: Josie Garcia             ALLERGIES: Patient has no known allergies.    Medication Review:   Scheduled Medications:  cyproheptadine, 4 mg, Oral, Nightly  furosemide, 20 mg, Oral, Daily  gabapentin, 300 mg, Oral, Daily  HYDROcodone-acetaminophen, 1.5 tablet, Oral, Daily  nicotine, 1 patch, Transdermal, Q24H  Pimavanserin Tartrate, 34 mg, Oral, Daily  rOPINIRole, 2 mg, Oral, Q8H         PRN Medications:    acetaminophen    aluminum-magnesium hydroxide-simethicone    benzonatate    bisacodyl    ibuprofen    loperamide    magnesium hydroxide    ondansetron ODT    sodium chloride    tiZANidine   All medications reviewed.    ASSESSMENT & PLAN:      Dementia due to Parkinson's disease with behavioral disturbance  -Continue Requip XL  -Stopped Seroquel 25 mg am and 50 mg hs  -Continue Nuplazid (home med)  -Start citalopram 10 mg hs       Chronic pain  -Continue Norco 7.5 mg   -Continue Neurontin  -Continue Zanaflex       Edema  -Lasix    Special precautions: Special Precautions Level 3 (q15 min checks) .    Behavioral Health  Treatment Plan and Problem List: I have reviewed and approved the Behavioral Health Treatment Plan and Problem list.  The patient has had a chance to review and agrees with the treatment plan.    Copied text in portions of this note has been reviewed and is accurate as of 05/23/24         Clinician:  Guero Corrales MD  05/23/24  15:01 EDT

## 2024-05-24 VITALS
OXYGEN SATURATION: 97 % | SYSTOLIC BLOOD PRESSURE: 128 MMHG | HEIGHT: 76 IN | DIASTOLIC BLOOD PRESSURE: 65 MMHG | RESPIRATION RATE: 18 BRPM | TEMPERATURE: 97.1 F | HEART RATE: 62 BPM | BODY MASS INDEX: 25.04 KG/M2 | WEIGHT: 205.6 LBS

## 2024-05-24 PROCEDURE — 99238 HOSP IP/OBS DSCHRG MGMT 30/<: CPT | Performed by: PSYCHIATRY & NEUROLOGY

## 2024-05-24 RX ORDER — CITALOPRAM HYDROBROMIDE 10 MG/1
10 TABLET ORAL NIGHTLY
Qty: 30 TABLET | Refills: 0 | Status: SHIPPED | OUTPATIENT
Start: 2024-05-24

## 2024-05-24 RX ADMIN — FUROSEMIDE 20 MG: 20 TABLET ORAL at 08:57

## 2024-05-24 RX ADMIN — ROPINIROLE HYDROCHLORIDE 2 MG: 1 TABLET, FILM COATED ORAL at 13:24

## 2024-05-24 RX ADMIN — GABAPENTIN 300 MG: 300 CAPSULE ORAL at 08:55

## 2024-05-24 RX ADMIN — ROPINIROLE HYDROCHLORIDE 2 MG: 1 TABLET, FILM COATED ORAL at 06:05

## 2024-05-24 RX ADMIN — HYDROCODONE BITARTRATE AND ACETAMINOPHEN 1.5 TABLET: 7.5; 325 TABLET ORAL at 08:55

## 2024-05-24 NOTE — PROGRESS NOTES
Discharge Note:     On date of discharge, patient is calm and cooperative. Patient denies SI/HI/AVH. Patient reports improvement in mood and symptoms during hospitalization. Patient is future oriented, looking forward to returning home with family. Patient is able to identify protective factors and healthy coping skills. Patient is agreeable to return to the nearest ED/contact 911 if they experience SI/HI/AVH. Patient denies needs or concerns prior to discharge today.

## 2024-05-24 NOTE — DISCHARGE SUMMARY
":  1955  MRN:  [id number]  Visit Number:  [id number]      Date of Admission:2024   Date of Discharge:  2024    Discharge Diagnosis:  Active Problems:    Dementia due to Parkinson's disease with behavioral disturbance    Chronic pain        Admission Diagnosis:  Behavioral change [R46.89]     VANESSA Bernal is a 68 y.o. male who was admitted on 2024 with complaints of worsening confusion and episodic agitation.   For details please see H&P dated 24.     Hospital Course  Patient is a 68 y.o. male presented with confusion and episodic agitation. The patient has a history of Parkinson's disease and associated dementia. The patient was admitted to the senior psych unit for safety, further evaluation and treatment. The patient was continued on his home medications including Nuplazid and Seroquel. The patient became very sedated and lethargic after taking his first dose of Seroquel 50 mg and it appeared that he was not taking his medications regularly at home. Seroquel was held and patient was noted to be more alert and active. To help with agitation associated with dementia, citalopram 10 mg was started and patient was able to take it without any adverse effects. The patient had an element of confusion but was able to maintain appropriate behaviors and didn't act out while in the hospital. He wanted to go home and was discharged home on 24/       Mental Status Exam upon discharge:   Mood \"better\"   Affect-congruent, appropriate, stable  Thought Content-goal directed, no delusional material present  Thought process- a bit disorganized.  Suicidality: No SI  Homicidality: No HI  Perception: No AH/    Procedures Performed         Consults:   Consults       No orders found from 2024 to 2024.            Pertinent Test Results:   Admission on 2024   Component Date Value Ref Range Status    Hepatitis B Surface Ag 2024 Non-Reactive  Non-Reactive Final    Hep A IgM 2024 " Non-Reactive  Non-Reactive Final    Hep B C IgM 05/21/2024 Non-Reactive  Non-Reactive Final    Hepatitis C Ab 05/21/2024 Non-Reactive  Non-Reactive Final    QT Interval 05/20/2024 496  ms Final    QTC Interval 05/20/2024 470  ms Final    Total Cholesterol 05/21/2024 134  0 - 200 mg/dL Final    Triglycerides 05/21/2024 51  0 - 150 mg/dL Final    HDL Cholesterol 05/21/2024 45  40 - 60 mg/dL Final    LDL Cholesterol  05/21/2024 78  0 - 100 mg/dL Final    VLDL Cholesterol 05/21/2024 11  5 - 40 mg/dL Final    LDL/HDL Ratio 05/21/2024 1.75   Final    Hemoglobin A1C 05/21/2024 5.70 (H)  4.80 - 5.60 % Final    QT Interval 05/21/2024 486  ms Final    QTC Interval 05/21/2024 473  ms Final    HS Troponin T 05/21/2024 14  <22 ng/L Final    HS Troponin T 05/21/2024 14  <22 ng/L Final    Troponin T Delta 05/21/2024 0  >=-4 - <+4 ng/L Final   Admission on 05/20/2024, Discharged on 05/20/2024   Component Date Value Ref Range Status    Glucose 05/20/2024 113 (H)  65 - 99 mg/dL Final    BUN 05/20/2024 16  8 - 23 mg/dL Final    Creatinine 05/20/2024 0.90  0.76 - 1.27 mg/dL Final    Sodium 05/20/2024 143  136 - 145 mmol/L Final    Potassium 05/20/2024 4.0  3.5 - 5.2 mmol/L Final    Chloride 05/20/2024 109 (H)  98 - 107 mmol/L Final    CO2 05/20/2024 25.7  22.0 - 29.0 mmol/L Final    Calcium 05/20/2024 9.2  8.6 - 10.5 mg/dL Final    Total Protein 05/20/2024 6.9  6.0 - 8.5 g/dL Final    Albumin 05/20/2024 4.2  3.5 - 5.2 g/dL Final    ALT (SGPT) 05/20/2024 9  1 - 41 U/L Final    AST (SGOT) 05/20/2024 12  1 - 40 U/L Final    Alkaline Phosphatase 05/20/2024 86  39 - 117 U/L Final    Total Bilirubin 05/20/2024 0.8  0.0 - 1.2 mg/dL Final    Globulin 05/20/2024 2.7  gm/dL Final    A/G Ratio 05/20/2024 1.6  g/dL Final    BUN/Creatinine Ratio 05/20/2024 17.8  7.0 - 25.0 Final    Anion Gap 05/20/2024 8.3  5.0 - 15.0 mmol/L Final    eGFR 05/20/2024 93.6  >60.0 mL/min/1.73 Final    Color, UA 05/20/2024 Dark Yellow (A)  Yellow, Straw Final     Appearance, UA 05/20/2024 Clear  Clear Final    pH, UA 05/20/2024 5.5  5.0 - 8.0 Final    Specific Gravity, UA 05/20/2024 1.028  1.005 - 1.030 Final    Glucose, UA 05/20/2024 Negative  Negative Final    Ketones, UA 05/20/2024 Trace (A)  Negative Final    Bilirubin, UA 05/20/2024 Small (1+) (A)  Negative Final    Blood, UA 05/20/2024 Negative  Negative Final    Protein, UA 05/20/2024 Trace (A)  Negative Final    Leuk Esterase, UA 05/20/2024 Negative  Negative Final    Nitrite, UA 05/20/2024 Negative  Negative Final    Urobilinogen, UA 05/20/2024 2.0 E.U./dL (A)  0.2 - 1.0 E.U./dL Final    THC, Screen, Urine 05/20/2024 Negative  Negative Final    Phencyclidine (PCP), Urine 05/20/2024 Negative  Negative Final    Cocaine Screen, Urine 05/20/2024 Negative  Negative Final    Methamphetamine, Ur 05/20/2024 Negative  Negative Final    Opiate Screen 05/20/2024 Positive (A)  Negative Final    Amphetamine Screen, Urine 05/20/2024 Negative  Negative Final    Benzodiazepine Screen, Urine 05/20/2024 Negative  Negative Final    Tricyclic Antidepressants Screen 05/20/2024 Positive (A)  Negative Final    Methadone Screen, Urine 05/20/2024 Negative  Negative Final    Barbiturates Screen, Urine 05/20/2024 Negative  Negative Final    Oxycodone Screen, Urine 05/20/2024 Negative  Negative Final    Buprenorphine, Screen, Urine 05/20/2024 Negative  Negative Final    Magnesium 05/20/2024 2.2  1.6 - 2.4 mg/dL Final    Ethanol 05/20/2024 <10  0 - 10 mg/dL Final    Ethanol % 05/20/2024 <0.010  % Final    WBC 05/20/2024 5.86  3.40 - 10.80 10*3/mm3 Final    RBC 05/20/2024 4.34  4.14 - 5.80 10*6/mm3 Final    Hemoglobin 05/20/2024 13.2  13.0 - 17.7 g/dL Final    Hematocrit 05/20/2024 39.5  37.5 - 51.0 % Final    MCV 05/20/2024 91.0  79.0 - 97.0 fL Final    MCH 05/20/2024 30.4  26.6 - 33.0 pg Final    MCHC 05/20/2024 33.4  31.5 - 35.7 g/dL Final    RDW 05/20/2024 12.7  12.3 - 15.4 % Final    RDW-SD 05/20/2024 41.6  37.0 - 54.0 fl Final    MPV  05/20/2024 9.2  6.0 - 12.0 fL Final    Platelets 05/20/2024 182  140 - 450 10*3/mm3 Final    Neutrophil % 05/20/2024 80.4 (H)  42.7 - 76.0 % Final    Lymphocyte % 05/20/2024 10.6 (L)  19.6 - 45.3 % Final    Monocyte % 05/20/2024 5.5  5.0 - 12.0 % Final    Eosinophil % 05/20/2024 2.0  0.3 - 6.2 % Final    Basophil % 05/20/2024 1.2  0.0 - 1.5 % Final    Immature Grans % 05/20/2024 0.3  0.0 - 0.5 % Final    Neutrophils, Absolute 05/20/2024 4.71  1.70 - 7.00 10*3/mm3 Final    Lymphocytes, Absolute 05/20/2024 0.62 (L)  0.70 - 3.10 10*3/mm3 Final    Monocytes, Absolute 05/20/2024 0.32  0.10 - 0.90 10*3/mm3 Final    Eosinophils, Absolute 05/20/2024 0.12  0.00 - 0.40 10*3/mm3 Final    Basophils, Absolute 05/20/2024 0.07  0.00 - 0.20 10*3/mm3 Final    Immature Grans, Absolute 05/20/2024 0.02  0.00 - 0.05 10*3/mm3 Final    nRBC 05/20/2024 0.0  0.0 - 0.2 /100 WBC Final    Fentanyl, Urine 05/20/2024 Negative  Negative Final        Condition on Discharge:  improved    Vital Signs  Temp:  [97 °F (36.1 °C)-97.1 °F (36.2 °C)] 97.1 °F (36.2 °C)  Heart Rate:  [54-63] 62  Resp:  [18-19] 18  BP: (101-172)/(55-97) 128/65      Discharge Disposition:  Home or Self Care    Discharge Medications:     Discharge Medications        New Medications        Instructions Start Date   citalopram 10 MG tablet  Commonly known as: CeleXA   10 mg, Oral, Nightly             Continue These Medications        Instructions Start Date   cyproheptadine 4 MG tablet  Commonly known as: PERIACTIN   4 mg, Oral, Nightly      furosemide 20 MG tablet  Commonly known as: LASIX   20 mg, Oral, Daily      gabapentin 300 MG capsule  Commonly known as: NEURONTIN   300 mg, Oral, Daily      HYDROcodone-acetaminophen 7.5-325 MG per tablet  Commonly known as: NORCO   1.5 tablets, Oral, Daily      Pimavanserin Tartrate 34 MG capsule   34 mg, Oral, Daily      rOPINIRole XL 6 MG tablet sustained-release 24 hour 24 hr tablet  Commonly known as: REQUIP XL   6 mg, Oral, Nightly       tiZANidine 4 MG tablet  Commonly known as: ZANAFLEX   4 mg, Oral, Nightly PRN             Stop These Medications      QUEtiapine 25 MG tablet  Commonly known as: SEROquel              Discharge Diet: Regular     Activity at Discharge: As tolerated     Follow-up Appointments  Eduar Wilkinson MD  [address] [place], [place] [id number] [phone]        Time: I spent  < 30  minutes on this discharge activity which included: face-to-face encounter with the patient, reviewing the data in the system, coordination of the care with the nursing staff as well as consultants, documentation, and entering orders.        Clinician:   Guero Corrales MD  05/24/24  13:01 EDT

## 2024-05-24 NOTE — PLAN OF CARE
Problem: Adult Behavioral Health Plan of Care  Goal: Optimized Coping Skills in Response to Life Stressors  Outcome: Ongoing, Progressing  Flowsheets (Taken 5/23/2024 1552)  Optimized Coping Skills in Response to Life Stressors: making progress toward outcome  Intervention: Promote Effective Coping Strategies  Flowsheets (Taken 5/24/2024 0814 by Neva Randolph, RN)  Supportive Measures:   decision-making supported   goal-setting facilitated   positive reinforcement provided   problem-solving facilitated   relaxation techniques promoted   self-care encouraged   self-reflection promoted   self-responsibility promoted   verbalization of feelings encouraged  Goal: Develops/Participates in Therapeutic [place] to Support Successful Transition  Outcome: Ongoing, Progressing  Flowsheets (Taken 5/23/2024 1552)  Develops/Participates in Therapeutic [place] to Support Successful Transition: making progress toward outcome  Intervention: Foster Therapeutic [place]  Flowsheets (Taken 5/23/2024 2000 by Silvina Mondragon, RN)  Trust Relationship/Rapport:   care explained   emotional support provided   choices provided   empathic listening provided   questions encouraged   reassurance provided   thoughts/feelings acknowledged  Intervention: Mutually Develop Transition Plan  Flowsheets  Taken 5/24/2024 1120 by Vika Topete  Offered/Gave Vendor List: no  Taken 5/21/2024 0926 by Vika Topete  Outpatient/Agency/Support Group Needs:   outpatient medication management   outpatient counseling   outpatient psychiatric care (specify)  Discharge Coordination/Progress: Patient has insurance and denies transportation concerns. Patient agreeable with discharge planning.  Transition Support:   community resources reviewed   follow-up care coordinated   follow-up care discussed   crisis management plan promoted   crisis management plan verbalized  Anticipated Discharge Disposition: home with family  Transportation Concerns: none  Current  Discharge Risk: cognitively impaired  Concerns to be Addressed: cognitive/perceptual  Readmission Within the Last 30 Days: no previous admission in last 30 days  Patient's Choice of Community Agency(s): Dr. GERA Wilkinson's office  Taken 5/20/2024 2012 by Lila Marr RN  Transportation Anticipated: family or friend will provide  Patient/Family Anticipated Services at Transition:   mental health services   outpatient care  Patient/Family Anticipates Transition to: home with family   Goal Outcome Evaluation:  Consent Given to Review Plan with: Phylicia Bernal, wife and dillon Brown  Progress: improving  Outcome Evaluation: Therapist met with patient to review plan of care; patient agreeable.       DATA:      Therapist discussed case with RN and met with patient today to review coping skills, review plan of care, and discuss discharge.    Therapist provided Phylicia with an update on the patient. She remains supportive.      Clinical Maneuvering/Intervention:     Therapist assisted patient in processing above session content; acknowledged and normalized patient’s thoughts, feelings, and concerns.  Discussed the therapist/patient relationship and explain the parameters and limitations of relative confidentiality.  Also discussed the importance of active participation, and honesty to the treatment process.  Encouraged the patient to discuss/vent their feelings, frustrations, and fears concerning their ongoing medical issues and validated their feelings.     Allowed patient to freely discuss issues without interruption or judgment. Provided safe, confidential environment to facilitate the development of positive therapeutic relationship and encourage open, honest communication.      Therapist addressed discharge safety planning this date. Assisted patient in identifying risk factors which would indicate the need for higher level of care after discharge;  including thoughts to harm self or others and/or self-harming  behavior. Encouraged patient to call 911, or present to the nearest emergency room should any of these events occur. Discussed crisis intervention services and means to access.  Encouraged securing any objects of harm.    Therapist completed integrated summary, treatment plan, and initiated social history this date.  Therapist is strongly encouraging family involvement in treatment.       Encouraged mask wearing, social distancing, and regular hand washing due to COVID19 risk.      ASSESSMENT:      Therapist met 1:1 with patient today. Patient denies suicidal ideation. Patient denies homicidal ideation. Patient denies AVH.No paranoia reported or observed. Patient's behaviors have been appropriate and his mood appears stable. He is anxious about being away from his wife but seems to be coping fairly well.      PLAN:       Patient to remain hospitalized this date.      Treatment team will focus efforts on stabilizing patient's acute symptoms while providing education on healthy coping and crisis management to reduce hospitalizations.   Patient requires daily psychiatrist evaluation and 24/7 nursing supervision to promote patient  safety.     Therapist will offer 1-4 individual sessions, 1 therapy group daily, family education, and appropriate referral.

## 2024-05-24 NOTE — PLAN OF CARE
Goal Outcome Evaluation:  Plan of Care Reviewed With: patient  Patient Agreement with Plan of Care: agrees        Outcome Evaluation: Pt reports good appetite and sleep. A 4 D 6. Denies SI/HI/AVH. D/O to time. Slept 7 hrs

## 2024-05-25 NOTE — PAYOR COMM NOTE
"Margy Bernal (68 y.o. Male)       Date of Birth   1955    Social Security Number       Address   [address] [place] [address]    Home Phone   [phone]    MRN   [id number]       Hoahaoism   None    Marital Status                               Admission Date   5/20/24    Admission Type   Emergency    Admitting Provider   Aaron Montenegro MD    Attending Provider       Department, Room/Bed   [facility], 1024/1S       Discharge Date   5/24/2024    Discharge Disposition   Home or Self Care    Discharge Destination   Home                              Attending Provider: (none)   Allergies: No Known Allergies    Isolation: None   Infection: None   Code Status: Prior    Ht: 193 cm (76\")   Wt: 93.3 kg (205 lb 9.6 oz)    Admission Cmt: None   Principal Problem: None                  Active Insurance as of 5/20/2024       Primary Coverage       Payor Plan Insurance Group Employer/Plan Group    HU[employer]NA MEDICARE REPLACEMENT HU[employer] MED ADV GROUP [id number]       Payor Plan Address Payor Plan Phone Number Payor Plan Fax Number Effective Dates    [address] [address] [phone]  7/1/2021 - None Entered    [facility] [id number]         Subscriber Name Subscriber Birth Date Member ID       MARGY BERNAL 1955 [id number]                     Emergency Contacts        (Rel.) Home Phone Work Phone Mobile Phone    MOISES PETTY (Son) [phone] -- --            PLEASE ATTACH THIS DISCHARGE INFORMATION TO AUTH. # [id number]     DISCHARGE DATE:  05/24/2024    Discharge Diagnosis:  Active Problems:    Dementia due to Parkinson's disease with behavioral disturbance    Dementia due to Parkinson's disease with behavioral disturbance ICD-10-CM: G20.A1, F02.818  ICD-9-CM: 332.0, 294.11   5/21/2024 - Present     FOLLOW UP:  Eduar Wilkinson MD  [address] [place] [address] [phone]     Please contact the office for an appointment within 5-7 days of discharge.    "      Martina [phone]     Will follow up after discharge.         Additional Resources:      [facility] [phone]     [place] [facility] [phone]  *request to speak with someone about in-home services for seniors        Medication List  Medication List    Morning Afternoon Evening Bedtime As Needed    citalopram 10 MG tablet  Commonly known as: CeleXA  Take 1 tablet by mouth Every Night. Indications: Behavioral Disorders associated with Dementia  For: Behavioral Disorders associated with Dementia  Last time this was given: 10 mg on May 23, 2024  9:18 PM  Signed by: Guero Corrales         cyproheptadine 4 MG tablet  Commonly known as: PERIACTIN  Take 1 tablet by mouth Every Night.  For: Decrease in Appetite  Last time this was given: 4 mg on May 23, 2024  9:18 PM         furosemide 20 MG tablet  Commonly known as: LASIX  Take 1 tablet by mouth Daily.  For: Edema  Last time this was given: 20 mg on May 24, 2024  8:57 AM         gabapentin 300 MG capsule  Commonly known as: NEURONTIN  Take 1 capsule by mouth Daily.  For: Neuropathic Pain  Last time this was given: 300 mg on May 24, 2024  8:55 AM         HYDROcodone-acetaminophen 7.5-325 MG per tablet  Commonly known as: NORCO  Take 1.5 tablets by mouth Daily.  For: Pain  Last time this was given: 1.5 tablets on May 24, 2024  8:55 AM         Pimavanserin Tartrate 34 MG capsule  Take 1 capsule by mouth Daily.  For: Psychosis  Last time this was given: 34 mg on May 24, 2024  9:11 AM         rOPINIRole XL 6 MG tablet sustained-release 24 hour 24 hr tablet  Commonly known as: REQUIP XL  Take 1 tablet by mouth Every Night.  For: Parkinson's Disease  Last time this was given: Ask your nurse or doctor         tiZANidine 4 MG tablet  Commonly known as: ZANAFLEX  Take 1 tablet by mouth At Night As Needed for Muscle Spasms.  For: Musculoskeletal Pain  Last time this was given: 4 mg on May 23, 2024  9:20 PM              Vika Topete     Psychiatry     Progress Notes      Signed     Date of Service: 24  Creation Time: 24     Signed         Discharge Note:      On date of discharge, patient is calm and cooperative. Patient denies SI/HI/AVH. Patient reports improvement in mood and symptoms during hospitalization. Patient is future oriented, looking forward to returning home with family. Patient is able to identify protective factors and healthy coping skills. Patient is agreeable to return to the nearest ED/contact 911 if they experience SI/HI/AVH. Patient denies needs or concerns prior to discharge today.                        Guero Corrales MD   Physician  Psychiatry     Discharge Summary      Signed     Date of Service: 24  Creation Time: 24     Signed         :  1955  MRN:  [id number]  Visit Number:  [id number]        Date of Admission:2024   Date of Discharge:  2024     Discharge Diagnosis:  Active Problems:    Dementia due to Parkinson's disease with behavioral disturbance    Chronic pain           Admission Diagnosis:  Behavioral change [R46.89]        VANESSA Bernal is a 68 y.o. male who was admitted on 2024 with complaints of worsening confusion and episodic agitation.   For details please see H&P dated 24.      Hospital Course  Patient is a 68 y.o. male presented with confusion and episodic agitation. The patient has a history of Parkinson's disease and associated dementia. The patient was admitted to the senior psych unit for safety, further evaluation and treatment. The patient was continued on his home medications including Nuplazid and Seroquel. The patient became very sedated and lethargic after taking his first dose of Seroquel 50 mg and it appeared that he was not taking his medications regularly at home. Seroquel was held and patient was noted to be more alert and active. To help with agitation associated with dementia,  "citalopram 10 mg was started and patient was able to take it without any adverse effects. The patient had an element of confusion but was able to maintain appropriate behaviors and didn't act out while in the hospital. He wanted to go home and was discharged home on 5/24/24/        Mental Status Exam upon discharge:   Mood \"better\"   Affect-congruent, appropriate, stable  Thought Content-goal directed, no delusional material present  Thought process- a bit disorganized.  Suicidality: No SI  Homicidality: No HI  Perception: No AH/VH     Procedures Performed        Consults:   Consults         No orders found from 4/21/2024 to 5/21/2024.                Pertinent Test Results:           Admission on 05/20/2024   Component Date Value Ref Range Status    Hepatitis B Surface Ag 05/21/2024 Non-Reactive  Non-Reactive Final    Hep A IgM 05/21/2024 Non-Reactive  Non-Reactive Final    Hep B C IgM 05/21/2024 Non-Reactive  Non-Reactive Final    Hepatitis C Ab 05/21/2024 Non-Reactive  Non-Reactive Final    QT Interval 05/20/2024 496  ms Final    QTC Interval 05/20/2024 470  ms Final    Total Cholesterol 05/21/2024 134  0 - 200 mg/dL Final    Triglycerides 05/21/2024 51  0 - 150 mg/dL Final    HDL Cholesterol 05/21/2024 45  40 - 60 mg/dL Final    LDL Cholesterol  05/21/2024 78  0 - 100 mg/dL Final    VLDL Cholesterol 05/21/2024 11  5 - 40 mg/dL Final    LDL/HDL Ratio 05/21/2024 1.75    Final    Hemoglobin A1C 05/21/2024 5.70 (H)  4.80 - 5.60 % Final    QT Interval 05/21/2024 486  ms Final    QTC Interval 05/21/2024 473  ms Final    HS Troponin T 05/21/2024 14  <22 ng/L Final    HS Troponin T 05/21/2024 14  <22 ng/L Final    Troponin T Delta 05/21/2024 0  >=-4 - <+4 ng/L Final   Admission on 05/20/2024, Discharged on 05/20/2024   Component Date Value Ref Range Status    Glucose 05/20/2024 113 (H)  65 - 99 mg/dL Final    BUN 05/20/2024 16  8 - 23 mg/dL Final    Creatinine 05/20/2024 0.90  0.76 - 1.27 mg/dL Final    Sodium 05/20/2024 " 143  136 - 145 mmol/L Final    Potassium 05/20/2024 4.0  3.5 - 5.2 mmol/L Final    Chloride 05/20/2024 109 (H)  98 - 107 mmol/L Final    CO2 05/20/2024 25.7  22.0 - 29.0 mmol/L Final    Calcium 05/20/2024 9.2  8.6 - 10.5 mg/dL Final    Total Protein 05/20/2024 6.9  6.0 - 8.5 g/dL Final    Albumin 05/20/2024 4.2  3.5 - 5.2 g/dL Final    ALT (SGPT) 05/20/2024 9  1 - 41 U/L Final    AST (SGOT) 05/20/2024 12  1 - 40 U/L Final    Alkaline Phosphatase 05/20/2024 86  39 - 117 U/L Final    Total Bilirubin 05/20/2024 0.8  0.0 - 1.2 mg/dL Final    Globulin 05/20/2024 2.7  gm/dL Final    A/G Ratio 05/20/2024 1.6  g/dL Final    BUN/Creatinine Ratio 05/20/2024 17.8  7.0 - 25.0 Final    Anion Gap 05/20/2024 8.3  5.0 - 15.0 mmol/L Final    eGFR 05/20/2024 93.6  >60.0 mL/min/1.73 Final    Color, UA 05/20/2024 Dark Yellow (A)  Yellow, Straw Final    Appearance, UA 05/20/2024 Clear  Clear Final    pH, UA 05/20/2024 5.5  5.0 - 8.0 Final    Specific Gravity, UA 05/20/2024 1.028  1.005 - 1.030 Final    Glucose, UA 05/20/2024 Negative  Negative Final    Ketones, UA 05/20/2024 Trace (A)  Negative Final    Bilirubin, UA 05/20/2024 Small (1+) (A)  Negative Final    Blood, UA 05/20/2024 Negative  Negative Final    Protein, UA 05/20/2024 Trace (A)  Negative Final    Leuk Esterase, UA 05/20/2024 Negative  Negative Final    Nitrite, UA 05/20/2024 Negative  Negative Final    Urobilinogen, UA 05/20/2024 2.0 E.U./dL (A)  0.2 - 1.0 E.U./dL Final    THC, Screen, Urine 05/20/2024 Negative  Negative Final    Phencyclidine (PCP), Urine 05/20/2024 Negative  Negative Final    Cocaine Screen, Urine 05/20/2024 Negative  Negative Final    Methamphetamine, Ur 05/20/2024 Negative  Negative Final    Opiate Screen 05/20/2024 Positive (A)  Negative Final    Amphetamine Screen, Urine 05/20/2024 Negative  Negative Final    Benzodiazepine Screen, Urine 05/20/2024 Negative  Negative Final    Tricyclic Antidepressants Screen 05/20/2024 Positive (A)  Negative Final     Methadone Screen, Urine 05/20/2024 Negative  Negative Final    Barbiturates Screen, Urine 05/20/2024 Negative  Negative Final    Oxycodone Screen, Urine 05/20/2024 Negative  Negative Final    Buprenorphine, Screen, Urine 05/20/2024 Negative  Negative Final    Magnesium 05/20/2024 2.2  1.6 - 2.4 mg/dL Final    Ethanol 05/20/2024 <10  0 - 10 mg/dL Final    Ethanol % 05/20/2024 <0.010  % Final    WBC 05/20/2024 5.86  3.40 - 10.80 10*3/mm3 Final    RBC 05/20/2024 4.34  4.14 - 5.80 10*6/mm3 Final    Hemoglobin 05/20/2024 13.2  13.0 - 17.7 g/dL Final    Hematocrit 05/20/2024 39.5  37.5 - 51.0 % Final    MCV 05/20/2024 91.0  79.0 - 97.0 fL Final    MCH 05/20/2024 30.4  26.6 - 33.0 pg Final    MCHC 05/20/2024 33.4  31.5 - 35.7 g/dL Final    RDW 05/20/2024 12.7  12.3 - 15.4 % Final    RDW-SD 05/20/2024 41.6  37.0 - 54.0 fl Final    MPV 05/20/2024 9.2  6.0 - 12.0 fL Final    Platelets 05/20/2024 182  140 - 450 10*3/mm3 Final    Neutrophil % 05/20/2024 80.4 (H)  42.7 - 76.0 % Final    Lymphocyte % 05/20/2024 10.6 (L)  19.6 - 45.3 % Final    Monocyte % 05/20/2024 5.5  5.0 - 12.0 % Final    Eosinophil % 05/20/2024 2.0  0.3 - 6.2 % Final    Basophil % 05/20/2024 1.2  0.0 - 1.5 % Final    Immature Grans % 05/20/2024 0.3  0.0 - 0.5 % Final    Neutrophils, Absolute 05/20/2024 4.71  1.70 - 7.00 10*3/mm3 Final    Lymphocytes, Absolute 05/20/2024 0.62 (L)  0.70 - 3.10 10*3/mm3 Final    Monocytes, Absolute 05/20/2024 0.32  0.10 - 0.90 10*3/mm3 Final    Eosinophils, Absolute 05/20/2024 0.12  0.00 - 0.40 10*3/mm3 Final    Basophils, Absolute 05/20/2024 0.07  0.00 - 0.20 10*3/mm3 Final    Immature Grans, Absolute 05/20/2024 0.02  0.00 - 0.05 10*3/mm3 Final    nRBC 05/20/2024 0.0  0.0 - 0.2 /100 WBC Final    Fentanyl, Urine 05/20/2024 Negative  Negative Final         Condition on Discharge:  improved     Vital Signs  Temp:  [97 °F (36.1 °C)-97.1 °F (36.2 °C)] 97.1 °F (36.2 °C)  Heart Rate:  [54-63] 62  Resp:  [18-19] 18  BP:  (507-172)/(55-97) 128/65        Discharge Disposition:  Home or Self Care     Discharge Medications:      Discharge Medications          New Medications         Instructions Start Date   citalopram 10 MG tablet  Commonly known as: CeleXA    10 mg, Oral, Nightly                  Continue These Medications         Instructions Start Date   cyproheptadine 4 MG tablet  Commonly known as: PERIACTIN    4 mg, Oral, Nightly        furosemide 20 MG tablet  Commonly known as: LASIX    20 mg, Oral, Daily        gabapentin 300 MG capsule  Commonly known as: NEURONTIN    300 mg, Oral, Daily        HYDROcodone-acetaminophen 7.5-325 MG per tablet  Commonly known as: NORCO    1.5 tablets, Oral, Daily        Pimavanserin Tartrate 34 MG capsule    34 mg, Oral, Daily        rOPINIRole XL 6 MG tablet sustained-release 24 hour 24 hr tablet  Commonly known as: REQUIP XL    6 mg, Oral, Nightly        tiZANidine 4 MG tablet  Commonly known as: ZANAFLEX    4 mg, Oral, Nightly PRN                  Stop These Medications       QUEtiapine 25 MG tablet  Commonly known as: SEROquel                   Discharge Diet: Regular      Activity at Discharge: As tolerated      Follow-up Appointments  Eduar Wilkinson MD  [address] [place], [place] [id number] [phone]           Time: I spent  < 30  minutes on this discharge activity which included: face-to-face encounter with the patient, reviewing the data in the system, coordination of the care with the nursing staff as well as consultants, documentation, and entering orders.          Clinician:   Guero Corrales MD  05/24/24  13:01 EDT